# Patient Record
Sex: MALE | Race: ASIAN | URBAN - METROPOLITAN AREA
[De-identification: names, ages, dates, MRNs, and addresses within clinical notes are randomized per-mention and may not be internally consistent; named-entity substitution may affect disease eponyms.]

---

## 2021-10-12 ENCOUNTER — OFFICE VISIT (OUTPATIENT)
Dept: URBAN - METROPOLITAN AREA CLINIC 32 | Facility: CLINIC | Age: 26
End: 2021-10-12
Payer: COMMERCIAL

## 2021-10-12 PROCEDURE — 92025 CPTRIZED CORNEAL TOPOGRAPHY: CPT | Performed by: OPHTHALMOLOGY

## 2021-10-12 PROCEDURE — 99203 OFFICE O/P NEW LOW 30 MIN: CPT | Performed by: OPHTHALMOLOGY

## 2021-10-12 PROCEDURE — 76514 ECHO EXAM OF EYE THICKNESS: CPT | Performed by: OPHTHALMOLOGY

## 2021-10-12 ASSESSMENT — INTRAOCULAR PRESSURE
OD: 17
OS: 15

## 2021-10-12 NOTE — IMPRESSION/PLAN
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 Condition: quality of life issue. Vision: vision affected. Plan: Topography and Pachymetry OU ordered and performed today and results discussed with patient. Discussed diagnosis in detail with patient. Discussed treatment options with patient. Discussed updated glasses prescription vs custom contact lenses. Surgery not recommended at this time. Cross linking not recommended at this time. Discussed risk of progression with the present condition Recommend consultation with Dr Noam Taylor for custom contact lenses.

## 2021-11-02 ENCOUNTER — OFFICE VISIT (OUTPATIENT)
Dept: URBAN - METROPOLITAN AREA CLINIC 32 | Facility: CLINIC | Age: 26
End: 2021-11-02
Payer: COMMERCIAL

## 2021-11-02 DIAGNOSIS — H18.603 BILATERAL KERATOCONUS: Primary | ICD-10-CM

## 2021-11-02 DIAGNOSIS — H52.223 REGULAR ASTIGMATISM, BILATERAL: ICD-10-CM

## 2021-11-02 PROCEDURE — 92025 CPTRIZED CORNEAL TOPOGRAPHY: CPT | Performed by: OPTOMETRIST

## 2021-11-02 PROCEDURE — 99203 OFFICE O/P NEW LOW 30 MIN: CPT | Performed by: OPTOMETRIST

## 2021-11-02 PROCEDURE — 92310 CONTACT LENS FITTING OU: CPT | Performed by: OPTOMETRIST

## 2021-11-02 ASSESSMENT — VISUAL ACUITY
OS: 20/30
OD: 20/30

## 2021-11-02 ASSESSMENT — KERATOMETRY
OS: 44.30
OD: 46.59

## 2021-11-02 NOTE — IMPRESSION/PLAN
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 Plan: Advised patient of condition. Discussed treatment options with patient. Topography ordered and reviewed with patient. Recommend scleral lenses for BCVA. Order new lenses.

## 2022-07-28 NOTE — IMPRESSION/PLAN
Impression: Keratoconus, unstable, bilateral: O93.765. Plan: OD>OS. Refer to cornea team for consideration of corneal crosslinking.

## 2022-10-19 ENCOUNTER — OFFICE VISIT (OUTPATIENT)
Dept: URBAN - METROPOLITAN AREA CLINIC 10 | Facility: CLINIC | Age: 27
End: 2022-10-19
Payer: COMMERCIAL

## 2022-10-19 DIAGNOSIS — H18.623 KERATOCONUS, UNSTABLE, BILATERAL: Primary | ICD-10-CM

## 2022-10-19 PROCEDURE — 92025 CPTRIZED CORNEAL TOPOGRAPHY: CPT | Performed by: OPHTHALMOLOGY

## 2022-10-19 PROCEDURE — 76514 ECHO EXAM OF EYE THICKNESS: CPT | Performed by: OPHTHALMOLOGY

## 2022-10-19 PROCEDURE — 99204 OFFICE O/P NEW MOD 45 MIN: CPT | Performed by: OPHTHALMOLOGY

## 2022-10-19 ASSESSMENT — INTRAOCULAR PRESSURE
OS: 13
OD: 15

## 2022-10-19 ASSESSMENT — KERATOMETRY
OD: 47.63
OS: 44.13

## 2022-10-19 NOTE — IMPRESSION/PLAN
Impression: Keratoconus, unstable, bilateral: M47.645. Plan: Explained to the patient the progressive nature of the disease and can significantly reduce a patient's visual acuity and visual quality. First line treatments include hard contact lenses, scleral contact lenses and intracorneal ring segments. These treatments address signs and symptoms but do not halt progression of the disease. If the disease progresses, it can lead to corneal blindness and may require corneal transplantation for visual recovery which is invasive, has a prolonged recovery period, and carries a lifelong risk of tissue rejection, graft failure, post-surgical medications and other complications. Corneal crosslinking (CXL) is the only FDA-approved treatment modality that has been shown to slow or halt the progression of keratoconus. This may prevent the need for future corneal transplantation. Early treatment of keratoconus with CXL is medically necessary and indicated. The pt understands and would like to proceed with surgery. Will obtain notes from outside optometrist in the last 2 years to determine if progression has occurred , and if so will proceed with CXL at that time. If not, will bring pt back to [facility] office in 3-4 months for pentacam, pachy, refract, kolby.

## 2023-05-31 ENCOUNTER — OFFICE VISIT (OUTPATIENT)
Dept: URBAN - METROPOLITAN AREA CLINIC 10 | Facility: CLINIC | Age: 28
End: 2023-05-31
Payer: COMMERCIAL

## 2023-05-31 DIAGNOSIS — H18.623 KERATOCONUS, UNSTABLE, BILATERAL: Primary | ICD-10-CM

## 2023-05-31 PROCEDURE — 92025 CPTRIZED CORNEAL TOPOGRAPHY: CPT | Performed by: OPHTHALMOLOGY

## 2023-05-31 PROCEDURE — 99213 OFFICE O/P EST LOW 20 MIN: CPT | Performed by: OPHTHALMOLOGY

## 2023-05-31 ASSESSMENT — KERATOMETRY
OD: 48.50
OS: 46.00

## 2023-05-31 ASSESSMENT — VISUAL ACUITY
OS: 20/50
OD: 20/40

## 2023-05-31 ASSESSMENT — INTRAOCULAR PRESSURE
OD: 12
OS: 12

## 2023-06-01 NOTE — IMPRESSION/PLAN
Impression: Keratoconus, unstable, bilateral: H21.865. Plan: Explained to the patient the progressive nature of the disease and can significantly reduce a patient's visual acuity and visual quality. First line treatments include hard contact lenses, scleral contact lenses and intracorneal ring segments. These treatments address signs and symptoms but do not halt progression of the disease. If the disease progresses, it can lead to corneal blindness and may require corneal transplantation for visual recovery which is invasive, has a prolonged recovery period, and carries a lifelong risk of tissue rejection, graft failure, post-surgical medications and other complications. Corneal crosslinking (CXL) is the only FDA-approved treatment modality that has been shown to slow or halt the progression of keratoconus. This may prevent the need for future corneal transplantation. Early treatment of keratoconus with CXL is medically necessary and indicated. The pt understands and would like to proceed with surgery. Will obtain notes from outside optometrist in the last 2 years to determine if progression has occurred , and if so will proceed with CXL at that time. If not, will bring pt back to [facility] office in 3-4 months for pentacam, pachy, refract, kolby. UPDATE:
Astigmatism on refraction has increased >1D OS: has increased from 0.75D to 3.0D Myopic shift (decrease in spherical equivalent ) on refraction OS of 0.50D: -0.625D to -1.50 OD,  -0.125D to -1.75D OS Pt has failed conservative treatment: Pt has tried glasses/CTLs, and has continued to experience progressive loss in BCVA and increase in MRx. 
Scheduled CXL OU, OS first

## 2023-06-28 ENCOUNTER — PROCEDURE (OUTPATIENT)
Dept: URBAN - METROPOLITAN AREA CLINIC 10 | Facility: CLINIC | Age: 28
End: 2023-06-28
Payer: COMMERCIAL

## 2023-06-28 PROCEDURE — 0402T COLGN CRS-LINK CRN&PACHYMTRY: CPT | Performed by: OPHTHALMOLOGY

## 2023-06-29 ENCOUNTER — OFFICE VISIT (OUTPATIENT)
Dept: URBAN - METROPOLITAN AREA CLINIC 10 | Facility: CLINIC | Age: 28
End: 2023-06-29
Payer: COMMERCIAL

## 2023-06-29 DIAGNOSIS — H18.623 KERATOCONUS, UNSTABLE, BILATERAL: Primary | ICD-10-CM

## 2023-06-29 PROCEDURE — 99213 OFFICE O/P EST LOW 20 MIN: CPT | Performed by: STUDENT IN AN ORGANIZED HEALTH CARE EDUCATION/TRAINING PROGRAM

## 2023-06-29 NOTE — IMPRESSION/PLAN
Impression: Keratoconus, unstable, bilateral: Z33.321.
- Scheduled for Crosslinking OD Plan: s/p Crosslinking OS, BCL in place today. Patient doing well, reviewed instructions.  RTC 1 week for short with BCL removal.

## 2023-07-07 ENCOUNTER — OFFICE VISIT (OUTPATIENT)
Dept: URBAN - METROPOLITAN AREA CLINIC 10 | Facility: CLINIC | Age: 28
End: 2023-07-07
Payer: COMMERCIAL

## 2023-07-07 DIAGNOSIS — H18.623 KERATOCONUS, UNSTABLE, BILATERAL: Primary | ICD-10-CM

## 2023-07-07 PROCEDURE — 99213 OFFICE O/P EST LOW 20 MIN: CPT | Performed by: STUDENT IN AN ORGANIZED HEALTH CARE EDUCATION/TRAINING PROGRAM

## 2023-07-07 NOTE — IMPRESSION/PLAN
Impression: Keratoconus, unstable, bilateral: Y79.692.
- Scheduled for Crosslinking OD Plan: s/p Crosslinking OS, BCL removed in office today with no complication. Answered questions relating to purpose of Crosslinking, intacts, specialty lens fit.

## 2023-07-26 ENCOUNTER — PROCEDURE (OUTPATIENT)
Dept: URBAN - METROPOLITAN AREA CLINIC 10 | Facility: CLINIC | Age: 28
End: 2023-07-26
Payer: COMMERCIAL

## 2023-07-26 PROCEDURE — 0402T COLGN CRS-LINK CRN&PACHYMTRY: CPT | Performed by: OPHTHALMOLOGY

## 2023-07-27 ENCOUNTER — OFFICE VISIT (OUTPATIENT)
Dept: URBAN - METROPOLITAN AREA CLINIC 10 | Facility: CLINIC | Age: 28
End: 2023-07-27
Payer: COMMERCIAL

## 2023-07-27 DIAGNOSIS — H18.623 KERATOCONUS, UNSTABLE, BILATERAL: Primary | ICD-10-CM

## 2023-07-27 PROCEDURE — 99213 OFFICE O/P EST LOW 20 MIN: CPT | Performed by: STUDENT IN AN ORGANIZED HEALTH CARE EDUCATION/TRAINING PROGRAM

## 2023-08-04 ENCOUNTER — POST-OPERATIVE VISIT (OUTPATIENT)
Dept: URBAN - METROPOLITAN AREA CLINIC 10 | Facility: CLINIC | Age: 28
End: 2023-08-04
Payer: COMMERCIAL

## 2023-08-04 DIAGNOSIS — H18.623 KERATOCONUS, UNSTABLE, BILATERAL: Primary | ICD-10-CM

## 2023-08-04 PROCEDURE — 99213 OFFICE O/P EST LOW 20 MIN: CPT | Performed by: STUDENT IN AN ORGANIZED HEALTH CARE EDUCATION/TRAINING PROGRAM

## 2023-08-15 ENCOUNTER — OFFICE VISIT (OUTPATIENT)
Dept: URBAN - METROPOLITAN AREA CLINIC 44 | Facility: CLINIC | Age: 28
End: 2023-08-15
Payer: COMMERCIAL

## 2023-08-15 DIAGNOSIS — H18.623 KERATOCONUS, UNSTABLE, BILATERAL: Primary | ICD-10-CM

## 2023-08-15 PROCEDURE — 99213 OFFICE O/P EST LOW 20 MIN: CPT | Performed by: OPHTHALMOLOGY

## 2023-08-15 ASSESSMENT — INTRAOCULAR PRESSURE
OD: 13
OS: 13